# Patient Record
(demographics unavailable — no encounter records)

---

## 2024-11-13 NOTE — HISTORY OF PRESENT ILLNESS
[de-identified] : Patient has recently been randomly hoarse, today especially is hoarse. No throat pain or issues eating, drinking  She also has still been having issues with vertigo since the last visit on and off.  She has been taking tylenol for headaches that are usually in the forehead. She was recommend to have a balloon sinus procedure but it was not covered by insurance and so was never done. She continues to use Since the last visit she has also been allergy tested

## 2024-11-13 NOTE — ASSESSMENT
[FreeTextEntry1] : Patient been complaining of some raspiness of her voice now for about a couple of weeks she is here for evaluation fiberoptically she is got some swelling or some polypoid changes of her vocal cords but no tumors masses or any pathology of concern put her on a Medrol Dosepak we will reevaluate her in a month fully expecting her symptoms to improve she also is significantly congested in her nasal cavity and encouraged to go back to Flonase nasal spray religiously she also has recently been suffering from some vertigo to steroids may help her if not referral to one of our neuro otologist will be considered.

## 2024-11-13 NOTE — REVIEW OF SYSTEMS
[Negative] : Heme/Lymph [Vertigo] : vertigo [As Noted in HPI] : as noted in HPI [Sinus Pressure] : sinus pressure

## 2024-11-13 NOTE — END OF VISIT
[FreeTextEntry3] : I, Dr. Basurto personally performed the evaluation and management (E/M) services , including all procedures, for this established patient who presents today with (a) new problem(s)/exacerbation of (an) existing condition(s). That E/M includes conducting the clinically appropriate interval history &/or exam, assessing all new/exacerbated conditions, and establishing a new plan of care. Today, my BEVERLY, Shahana Rothman, was here to observe &/or participate in the visit & follow plan of care established by me.

## 2024-12-09 NOTE — HISTORY OF PRESENT ILLNESS
[de-identified] : Patient comes in with continued episodes of vertigo. When it happens, she does note its associated with certain  head movements. When it happens, she has to sit down and wait for it to pass. She gets episodes of vertigo almost daily. She does admit that when she is feeling the vertigo the has a hard time sitting still and waiting for it to pass She does have some changes in hearing and ear popping  She still has the hoarseness and the headaches since the last visit. SHe is still taking tylenol every 4 hours a day for the headaches.

## 2024-12-09 NOTE — REVIEW OF SYSTEMS
[As Noted in HPI] : as noted in HPI [Hearing Loss] : hearing loss [Dizziness] : dizziness [Vertigo] : vertigo [Negative] : Heme/Lymph

## 2024-12-09 NOTE — ASSESSMENT
[FreeTextEntry1] : Patient came with an advocate asked more or less the same questions that were reviewed in the past and once again we went through all of them she has been having now headaches for over a year reassured her it is definitely not her sinuses she has had multiple examinations that showed her sinuses are be clear she does have some popping of her ears and she has been using Flonase that has been helping but she starting to get staining I told her to stop instead we gave her a Medrol Dosepak to see if that will help instead.  Regarding her headaches have encouraged her to Sun'aq back to her neurologist to see if anything other than the Tylenol that she is taking every 4 hours she needs a primary care physician I have encouraged her to get one as well.  All of her questions and her surrogate who is with her questions were answered.  Audiogram was performed she will continue using course on exercises and I have encouraged her to follow-up with one of our neuro otologist if for further vestibular testing if her symptoms do not resolve.  On speaking to the patient this seems to be a cognitive component to some of her symptoms as well.

## 2025-02-03 NOTE — ASSESSMENT
[FreeTextEntry1] : Patient with significant cognitive issues is under the care of a neurologist for headaches for which she was recently prescribed Fioricet.  Tylenol does not seem to be helping her with her headaches this apparently is all associated after a fall she is under the care of her neurologist there is no other ENT intervention indicated at this time this was tried to be explained to the patient but I am not sure how much she is cognitively understanding.  She was encouraged to follow under the guidance of her neurologist.

## 2025-02-03 NOTE — REVIEW OF SYSTEMS
I have reviewed discharge instructions with the patient. The patient verbalized understanding. Patient armband removed and shredded    Patient ambulatory to ED lobby. [As Noted in HPI] : as noted in HPI [Hearing Loss] : hearing loss [Dizziness] : dizziness [Vertigo] : vertigo [Negative] : Heme/Lymph

## 2025-02-03 NOTE — HISTORY OF PRESENT ILLNESS
[de-identified] : Patient comes in with continued episodes of vertigo. When it happens, she does note its associated with certain  head movements. When it happens, she has to sit down and wait for it to pass. She gets episodes of vertigo almost daily. She does admit that when she is feeling the vertigo the has a hard time sitting still and waiting for it to pass She does have some changes in hearing and ear popping  She still has the hoarseness and the headaches since the last visit. She is still taking tylenol every 4 hours a day for the headaches.  [FreeTextEntry1] : TODAY:   Patient comes in stating that she still gets headaches. She saw her PCP who prescribed Fiorocet but she is concerned about taking it due to the side effects she read online. She was told last visit that she needs to see a neurologist but she still has not seen one.  She still complains of the same headaches and the same issues.

## 2025-02-04 NOTE — HISTORY OF PRESENT ILLNESS
[FreeTextEntry1] : pt presents for follow up for consultation on whether she needs to continue HRT, pt is asymptomatic without GYN complaints.

## 2025-02-04 NOTE — DISCUSSION/SUMMARY
[FreeTextEntry1] : A - HRT       menopause  P- f/u in Oct. 2025 for annual exam     Prempro refill sent to pharmacy     all questions answered

## 2025-02-26 NOTE — HISTORY OF PRESENT ILLNESS
[FreeTextEntry1] : VIRGINIA CURRY is a 78 year old female with PMH of HTN, CAD, not previously on AC/AP, anemia, s/p left nephrectomy, s/p cervical surgery 2014 who presented to [facility] ED on 3/10/24 s/p fall out of bed with head strike. CT head showed mild left parafalcine subdural hemorrhage with focal subarachnoid hemorrhage measuring 1.1 x 0.9 x 0.4cm adjacent to the falx along the left parietal convexity.  PCP: Dr. Valentin Chisholm Cardiologist: Dr. Joshua Grahma - [place]  Last seen via telehealth 4/3/2024 to review ct head done 4/1/2024  NO new imaging  Today she presents with chief complaint of headaches

## 2025-02-26 NOTE — ASSESSMENT
[FreeTextEntry1] : IMPRESSION: 78F with PMH of HTN, CAD, anemia, s/p L nephrectomy, s/p cervical surgery 2014, p/w fall with headstrike 3/10/24, CTH showed small traumatic L parafalcine subdural hematoma and traumatic subarachnoid hemorrhage adjacent to the falx along the left parietal convexity, managed conservatively. Subsequent scans showed resolution of the hemorrhage.   States she has had persistent headaches since the trauma. Has had no follow up imaging. No new neurological concerns other than headaches.    Plan: - CT head now to ensure no additional hemorrhage  - if CT negative, will refer to neurology for headache management

## 2025-02-26 NOTE — REASON FOR VISIT
[Home] : at home, [unfilled] , at the time of the visit. [Medical Office: ([facility])___] : at the medical office located in  [Verbal consent obtained from patient] : the patient, [unfilled] [FreeTextEntry1] : Reviewed results of repeat CT head non con done 4/1/24

## 2025-03-04 NOTE — REASON FOR VISIT
[FreeTextEntry1] : Reviewed results of repeat CT head non con done 4/1/24  [Home] : at home, [unfilled] , at the time of the visit. [Medical Office: ([facility])___] : at the medical office located in  [Telephone (audio)] : This telephonic visit was provided via audio only technology. [Verbal consent obtained from patient] : the patient, [unfilled] [Follow-Up: _____] : a [unfilled] follow-up visit

## 2025-03-04 NOTE — REASON FOR VISIT
[FreeTextEntry1] : Reviewed results of repeat CT head non con done 4/1/24  [Home] : at home, [unfilled] , at the time of the visit. [Medical Office: ([facility])___] : at the medical office located in  [Telephone (audio)] : This telephonic visit was provided via audio only technology. [Verbal consent obtained from patient] : the patient, [unfilled] [Follow-Up: _____] : a [unfilled] follow-up visit

## 2025-03-04 NOTE — REASON FOR VISIT
[FreeTextEntry1] : Reviewed results of repeat CT head non con done 4/1/24  [Home] : at home, [unfilled] , at the time of the visit. [Medical Office: ([facility])___] : at the medical office located in  [Telephone (audio)] : This telephonic visit was provided via audio only technology. [Verbal consent obtained from patient] : the patient, [unfilled] [Follow-Up: _____] : a [unfilled] follow-up visit

## 2025-03-04 NOTE — REASON FOR VISIT
[FreeTextEntry1] : Reviewed results of repeat CT head non con done 4/1/24  [Home] : at home, [unfilled] , at the time of the visit. [Medical Office: ([facility])___] : at the medical office located in  [Telephone (audio)] : This telephonic visit was provided via audio only technology. [Verbal consent obtained from patient] : the patient, [unfilled] [Follow-Up: _____] : a [unfilled] follow-up visit

## 2025-03-05 NOTE — HISTORY OF PRESENT ILLNESS
[FreeTextEntry1] : VIRGINIA CURRY is a 79 year old female with PMH of HTN, CAD, not previously on AC/AP, anemia, s/p left nephrectomy, s/p cervical surgery 2014 who presented to [facility] ED on 3/10/24 s/p fall out of bed with head strike. CT head showed mild left parafalcine subdural hemorrhage with focal subarachnoid hemorrhage measuring 1.1 x 0.9 x 0.4cm adjacent to the falx along the left parietal convexity.  PCP: Dr. Valentin Chisholm Cardiologist: Dr. Joshua Graham - [place]  Last seen via telehealth on 2/26/25 complaining of persistent headaches. CT head ordered   Today, she reports still with headache, and is taking Tylenol with good response. CT head did not reveal any new stroke or bleeding

## 2025-03-05 NOTE — PHYSICAL EXAM
[General Appearance - Alert] : alert [General Appearance - In No Acute Distress] : in no acute distress [General Appearance - Well Nourished] : well nourished [General Appearance - Well Developed] : well developed [Oriented To Time, Place, And Person] : oriented to person, place, and time [Impaired Insight] : insight and judgment were intact [Affect] : the affect was normal [Mood] : the mood was normal [Motor Strength] : muscle strength was normal in all four extremities [Involuntary Movements] : no involuntary movements were seen [No Muscle Atrophy] : normal bulk in all four extremities [Outer Ear] : the ears and nose were normal in appearance [Hearing Threshold Finger Rub Not Brule] : hearing was normal [Neck Appearance] : the appearance of the neck was normal [Abnormal Walk] : normal gait [Nail Clubbing] : no clubbing  or cyanosis of the fingernails [Musculoskeletal - Swelling] : no joint swelling seen [Motor Tone] : muscle strength and tone were normal

## 2025-03-05 NOTE — PHYSICAL EXAM
[General Appearance - Alert] : alert [General Appearance - In No Acute Distress] : in no acute distress [General Appearance - Well Nourished] : well nourished [General Appearance - Well Developed] : well developed [Oriented To Time, Place, And Person] : oriented to person, place, and time [Impaired Insight] : insight and judgment were intact [Affect] : the affect was normal [Mood] : the mood was normal [Motor Strength] : muscle strength was normal in all four extremities [Involuntary Movements] : no involuntary movements were seen [No Muscle Atrophy] : normal bulk in all four extremities [Outer Ear] : the ears and nose were normal in appearance [Hearing Threshold Finger Rub Not [place]] : hearing was normal [Neck Appearance] : the appearance of the neck was normal [Abnormal Walk] : normal gait [Nail Clubbing] : no clubbing  or cyanosis of the fingernails [Musculoskeletal - Swelling] : no joint swelling seen [Motor Tone] : muscle strength and tone were normal

## 2025-03-05 NOTE — ASSESSMENT
[FreeTextEntry1] : IMPRESSION: 79F with PMH of HTN, CAD, anemia, s/p L nephrectomy, s/p cervical surgery 2014, p/w fall with headstrike 3/10/24, CTH showed small traumatic L parafalcine subdural hematoma and traumatic subarachnoid hemorrhage adjacent to the falx along the left parietal convexity, managed conservatively. Subsequent scans showed resolution of the hemorrhage.   States she has had persistent headaches since the trauma. CT head did not reveal any new stroke or bleeding.    Plan: - Refer to Dr. Melara, neurology for persistent HA - Reassured her that no neurosurgical intervention needed at this time

## 2025-03-05 NOTE — HISTORY OF PRESENT ILLNESS
[FreeTextEntry1] : VIRGINIA CURRY is a 79 year old female with PMH of HTN, CAD, not previously on AC/AP, anemia, s/p left nephrectomy, s/p cervical surgery 2014 who presented to [facility] ED on 3/10/24 s/p fall out of bed with head strike. CT head showed mild left parafalcine subdural hemorrhage with focal subarachnoid hemorrhage measuring 1.1 x 0.9 x 0.4cm adjacent to the falx along the left parietal convexity.  PCP: Dr. Valentin Chisholm Cardiologist: Dr. Joshua Graham - [place]  Last seen via telehealth on 2/26/25 complaining of persistent headaches. CT head ordered   Today, she reports still with headache, and is taking Tylenol with good response. CT head did not reveal any new stroke or bleeding

## 2025-03-05 NOTE — PHYSICAL EXAM
[General Appearance - Alert] : alert [General Appearance - In No Acute Distress] : in no acute distress [General Appearance - Well Nourished] : well nourished [General Appearance - Well Developed] : well developed [Oriented To Time, Place, And Person] : oriented to person, place, and time [Impaired Insight] : insight and judgment were intact [Affect] : the affect was normal [Mood] : the mood was normal [Motor Strength] : muscle strength was normal in all four extremities [Involuntary Movements] : no involuntary movements were seen [No Muscle Atrophy] : normal bulk in all four extremities [Outer Ear] : the ears and nose were normal in appearance [Hearing Threshold Finger Rub Not Nance] : hearing was normal [Neck Appearance] : the appearance of the neck was normal [Abnormal Walk] : normal gait [Nail Clubbing] : no clubbing  or cyanosis of the fingernails [Musculoskeletal - Swelling] : no joint swelling seen [Motor Tone] : muscle strength and tone were normal

## 2025-03-05 NOTE — REVIEW OF SYSTEMS
[As Noted in HPI] : as noted in HPI [Negative] : Heme/Lymph [FreeTextEntry4] : headaches related to sinus issues

## 2025-03-05 NOTE — HISTORY OF PRESENT ILLNESS
[FreeTextEntry1] : VIRGINIA CURRY is a 79 year old female with PMH of HTN, CAD, not previously on AC/AP, anemia, s/p left nephrectomy, s/p cervical surgery 2014 who presented to [facility] ED on 3/10/24 s/p fall out of bed with head strike. CT head showed mild left parafalcine subdural hemorrhage with focal subarachnoid hemorrhage measuring 1.1 x 0.9 x 0.4cm adjacent to the falx along the left parietal convexity.  PCP: Dr. Valentin Chisholm Cardiologist: Dr. Joshua Graham - [place]  Last seen via telehealth on 2/26/25 complaining of persistent headaches. CT head ordered   Today, she reports still with headache, and is taking Tylenol with good response. CT head did not reveal any new stroke or bleeding

## 2025-03-05 NOTE — HISTORY OF PRESENT ILLNESS
[FreeTextEntry1] : VIRGINIA CURRY is a 79 year old female with PMH of HTN, CAD, not previously on AC/AP, anemia, s/p left nephrectomy, s/p cervical surgery 2014 who presented to [facility] ED on 3/10/24 s/p fall out of bed with head strike. CT head showed mild left parafalcine subdural hemorrhage with focal subarachnoid hemorrhage measuring 1.1 x 0.9 x 0.4cm adjacent to the falx along the left parietal convexity.  PCP: Dr. Valentin Chisholm Cardiologist: Dr. Joshua Graham - [place]  Last seen via telehealth on 2/26/25 complaining of persistent headaches. CT head ordered   Today, she reports still with headache, and is taking Tylenol with good response. CT head did not reveal any new stroke or bleeding

## 2025-03-05 NOTE — PHYSICAL EXAM
[General Appearance - Alert] : alert [General Appearance - In No Acute Distress] : in no acute distress [General Appearance - Well Nourished] : well nourished [General Appearance - Well Developed] : well developed [Oriented To Time, Place, And Person] : oriented to person, place, and time [Impaired Insight] : insight and judgment were intact [Affect] : the affect was normal [Mood] : the mood was normal [Motor Strength] : muscle strength was normal in all four extremities [Involuntary Movements] : no involuntary movements were seen [No Muscle Atrophy] : normal bulk in all four extremities [Outer Ear] : the ears and nose were normal in appearance [Hearing Threshold Finger Rub Not [place]] : hearing was normal [Neck Appearance] : the appearance of the neck was normal [Abnormal Walk] : normal gait [Nail Clubbing] : no clubbing  or cyanosis of the fingernails [Musculoskeletal - Swelling] : no joint swelling seen [Motor Tone] : muscle strength and tone were normal

## 2025-04-14 NOTE — ASSESSMENT
[FreeTextEntry1] : kidney stones - no stones seen in right solitary kidney   Continue potassium citrate 10 MEQ bid   plan 24 hr urine before next visit  Renal sono at or before next visit Follow up in 6 months  - - -

## 2025-04-14 NOTE — HISTORY OF PRESENT ILLNESS
[None] : None [FreeTextEntry1] : 79 yr old female presents to follow up with kidney stones and solitary kidney. Previously was seeing Dr. Major.   11/15: US today without kidney stones. LL today with elevated oxalate to 96 and and Johnny normal with elevated SS CaOx likely related to high phosphorus in urine suspect possible phosphate leak. No fevers/chills and has not been taking potassium citrate 2 tabs.  6/15: US today without stones. LL with hyperoxaluria, down to 60s. Discussed continuing citrate supplementation, struggles with K cit. Discussed switching to Moonstoone gummies 2 tabs twice a day. No fevers/chills. Also interested in switching to PCP here at [facility].  12/15: Here today for with ULS of kidney without kidney stones. 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 Will need to see Dr. Basurto regarding vocal cord polyp.  5/23/24 Here today for with ULS of kidney without kidney stones. Has been doing the moonstone gummies 2 tabs twice a day. She is doing well. No complaints. We discussed transitioning off the URocit-K tablets to Moonstone (currently taking about 10-15 meQ a day. Plan for switching to 2-3 Moonstone gummies and keeping up with hydration.  9/26: Here today for discussion around incidentally found elevated albumin to Creatinine ratio of 61. (normal is less than 30). Given her prior US with solitary kidney. R side kidney with some cysts. Given her solitary kidney, we will refer her nephrology to discuss. She otherwise has been doing well. We discussed that given her history, she should primarily focus on low salt, low protein diet.  April 2025 Pt is taking Potassium citrate 10 MEQ bid (prescribed TID)  24 hr urine- no done  Renal US- right kidney septated cyst, no stones. Left renal fossa is unremarkable.  [Dysuria] : no dysuria [Hematuria - Gross] : no gross hematuria

## 2025-07-08 NOTE — ASSESSMENT
[FreeTextEntry1] : 79F with mild HTN in the setting of CKD from neprectomy and recurrent kidney stones.

## 2025-07-08 NOTE — HISTORY OF PRESENT ILLNESS
[FreeTextEntry1] : 79F with hx of recurrent stones c/b obstruction s/p L nephrectomy for follow up of proteinuria and CKD.  Pt had surgery with Dr. Modi in 2010. She had a home eval by [facility] Nurse and was found to have 64mg/gm of microalbuminuria and was told to see a kidney doctor. She saw me for the first time in Oct 2024. Since then she saw Dr. Ramirez in April and found to have no stones. She has a septated cyst.   Of note patient she has no blood in the urine. She has no burning on urination. No LE swelling. No shortness of breath at rest. No rashes on her body.

## 2025-07-08 NOTE — PLAN
[TextEntry] : On last US pt was found to have no stones in place. She is already on citrate, will continue for now. She is drinking well. Will cont to avoid too much oxalate in diet. Will cont to maintain at least 2L of fluid intake. Avoid salt in diet.  Will check labs today for CKD.  f/w in 6-8 months at [address] [place] [address]